# Patient Record
Sex: FEMALE | Race: WHITE | NOT HISPANIC OR LATINO | URBAN - METROPOLITAN AREA
[De-identification: names, ages, dates, MRNs, and addresses within clinical notes are randomized per-mention and may not be internally consistent; named-entity substitution may affect disease eponyms.]

---

## 2020-07-16 ENCOUNTER — OFFICE VISIT (OUTPATIENT)
Dept: URBAN - METROPOLITAN AREA TELEHEALTH 2 | Facility: TELEHEALTH | Age: 35
End: 2020-07-16

## 2020-08-04 LAB
C-REACTIVE PROTEIN: (no result)
SED RATE BY MODIFIED WESTERGREN: (no result)

## 2021-10-12 ENCOUNTER — OFFICE VISIT (OUTPATIENT)
Dept: URBAN - METROPOLITAN AREA CLINIC 60 | Facility: CLINIC | Age: 36
End: 2021-10-12

## 2021-10-14 ENCOUNTER — LAB OUTSIDE AN ENCOUNTER (OUTPATIENT)
Dept: URBAN - METROPOLITAN AREA CLINIC 121 | Facility: CLINIC | Age: 36
End: 2021-10-14

## 2021-10-14 LAB
A/G RATIO: 2
ALBUMIN: (no result)
ALKALINE PHOSPHATASE: (no result)
ALT (SGPT): (no result)
AST (SGOT): (no result)
BASO (ABSOLUTE): (no result)
BASOS: (no result)
BILIRUBIN, TOTAL: (no result)
BUN/CREATININE RATIO: 19
BUN: (no result)
C-REACTIVE PROTEIN, QUANT: (no result)
CALCIUM: (no result)
CARBON DIOXIDE, TOTAL: (no result)
CHLORIDE: (no result)
CREATININE: (no result)
EGFR IF AFRICN AM: (no result)
EGFR IF NONAFRICN AM: (no result)
EOS (ABSOLUTE): (no result)
EOS: (no result)
GLOBULIN, TOTAL: (no result)
GLUCOSE: (no result)
HEMATOCRIT: (no result)
HEMATOLOGY COMMENTS:: (no result)
HEMOGLOBIN: (no result)
IMMATURE CELLS: (no result)
IMMATURE GRANS (ABS): (no result)
IMMATURE GRANULOCYTES: (no result)
LYMPHS (ABSOLUTE): (no result)
LYMPHS: (no result)
MCH: (no result)
MCHC: (no result)
MCV: (no result)
MONOCYTES(ABSOLUTE): (no result)
MONOCYTES: (no result)
NEUTROPHILS (ABSOLUTE): (no result)
NEUTROPHILS: (no result)
NRBC: (no result)
PLATELETS: (no result)
POTASSIUM: (no result)
PROTEIN, TOTAL: (no result)
RBC: (no result)
RDW: (no result)
SODIUM: (no result)
WBC: (no result)

## 2021-10-21 ENCOUNTER — OFFICE VISIT (OUTPATIENT)
Dept: URBAN - METROPOLITAN AREA TELEHEALTH 2 | Facility: TELEHEALTH | Age: 36
End: 2021-10-21

## 2021-10-23 LAB
C DIFFICILE TOXINS A+B, EIA: NEGATIVE
CALPROTECTIN, FECAL: (no result)
CAMPYLOBACTER CULTURE: (no result)
E COLI SHIGA TOXIN EIA: NEGATIVE
Lab: (no result)
OVA + PARASITE EXAM: (no result)
SALMONELLA/SHIGELLA SCREEN: (no result)
WHITE BLOOD CELLS (WBC), STOOL: (no result)

## 2021-12-08 ENCOUNTER — OFFICE VISIT (OUTPATIENT)
Dept: URBAN - METROPOLITAN AREA SURGERY CENTER 4 | Facility: SURGERY CENTER | Age: 36
End: 2021-12-08

## 2021-12-23 ENCOUNTER — OFFICE VISIT (OUTPATIENT)
Dept: URBAN - METROPOLITAN AREA TELEHEALTH 2 | Facility: TELEHEALTH | Age: 36
End: 2021-12-23

## 2021-12-29 ENCOUNTER — OFFICE VISIT (OUTPATIENT)
Dept: URBAN - METROPOLITAN AREA SURGERY CENTER 4 | Facility: SURGERY CENTER | Age: 36
End: 2021-12-29

## 2022-01-13 ENCOUNTER — OFFICE VISIT (OUTPATIENT)
Dept: URBAN - METROPOLITAN AREA TELEHEALTH 2 | Facility: TELEHEALTH | Age: 37
End: 2022-01-13

## 2022-03-17 ENCOUNTER — OFFICE VISIT (OUTPATIENT)
Dept: URBAN - METROPOLITAN AREA CLINIC 60 | Facility: CLINIC | Age: 37
End: 2022-03-17

## 2022-04-14 ENCOUNTER — OFFICE VISIT (OUTPATIENT)
Dept: URBAN - METROPOLITAN AREA CLINIC 60 | Facility: CLINIC | Age: 37
End: 2022-04-14

## 2022-04-20 ENCOUNTER — OFFICE VISIT (OUTPATIENT)
Dept: URBAN - METROPOLITAN AREA SURGERY CENTER 4 | Facility: SURGERY CENTER | Age: 37
End: 2022-04-20

## 2022-04-25 LAB — PATHOLOGY (INDENTED REPORT): (no result)

## 2022-05-05 ENCOUNTER — OFFICE VISIT (OUTPATIENT)
Dept: URBAN - METROPOLITAN AREA TELEHEALTH 2 | Facility: TELEHEALTH | Age: 37
End: 2022-05-05

## 2022-07-09 ENCOUNTER — TELEPHONE ENCOUNTER (OUTPATIENT)
Dept: URBAN - METROPOLITAN AREA CLINIC 121 | Facility: CLINIC | Age: 37
End: 2022-07-09

## 2022-07-09 RX ORDER — MESALAMINE 4 G/60ML
ENEMA RECTAL
Refills: 0 | OUTPATIENT
Start: 2015-11-24 | End: 2016-05-31

## 2022-07-09 RX ORDER — ONDANSETRON 4 MG/1
PRN TABLET, ORALLY DISINTEGRATING ORAL EVERY 8 HOURS
Refills: 1 | OUTPATIENT
Start: 2013-03-04 | End: 2013-09-18

## 2022-07-09 RX ORDER — MESALAMINE 1000 MG/1
ONCE A DAY AT NIGHT SUPPOSITORY RECTAL ONCE A DAY
Refills: 0 | OUTPATIENT
Start: 2021-10-25 | End: 2022-04-14

## 2022-07-09 RX ORDER — ONDANSETRON 4 MG/1
USE AS DIRECTED TAKE ONE TABLET 30 MINUTES PRIOR TO EACH PART OF COLONOSCOPY PREP TABLET, ORALLY DISINTEGRATING ORAL
Refills: 0 | OUTPATIENT
Start: 2022-04-14 | End: 2022-05-05

## 2022-07-09 RX ORDER — MESALAMINE 400 MG/1
TAKE 2 CAPSULES BY MOUTH THREE TIMES DAILY CAPSULE, DELAYED RELEASE ORAL
Refills: 1 | OUTPATIENT
Start: 2022-05-10 | End: 2022-05-10

## 2022-07-09 RX ORDER — MESALAMINE 4 G/60ML
ADMINISTER ENEMA Q HS PRN ENEMA RECTAL TAKE AS DIRECTED
Refills: 5 | OUTPATIENT
Start: 2015-08-18 | End: 2015-11-24

## 2022-07-09 RX ORDER — PREDNISONE 50 MG/1
TABLET ORAL
Refills: 0 | OUTPATIENT
Start: 2012-10-02 | End: 2013-03-04

## 2022-07-09 RX ORDER — HYDROCORTISONE ACETATE 25 MG/1
INSERT ONE INTO RECTUM QHS PRN SUPPOSITORY RECTAL
Refills: 1 | OUTPATIENT
Start: 2013-04-24 | End: 2013-09-18

## 2022-07-09 RX ORDER — METRONIDAZOLE 500 MG/1
TABLET ORAL THREE TIMES A DAY
Refills: 0 | OUTPATIENT
Start: 2013-04-24 | End: 2013-04-24

## 2022-07-09 RX ORDER — MESALAMINE 4 G/60ML
ONCE A DAY - QHS SUSPENSION RECTAL ONCE A DAY
Refills: 1 | OUTPATIENT
Start: 2015-07-28 | End: 2015-08-18

## 2022-07-09 RX ORDER — MESALAMINE 4 G/60ML
ENEMA RECTAL AS NEEDED
Refills: 0 | OUTPATIENT
Start: 2016-05-31 | End: 2019-11-05

## 2022-07-09 RX ORDER — PRENATAL VIT NO.130/IRON/FOLIC 27MG-0.8MG
TABLET ORAL
Refills: 0 | OUTPATIENT
Start: 2015-11-24 | End: 2016-05-31

## 2022-07-09 RX ORDER — METRONIDAZOLE 500 MG/1
TABLET ORAL THREE TIMES A DAY
Refills: 0 | OUTPATIENT
Start: 2013-03-04 | End: 2013-04-24

## 2022-07-09 RX ORDER — MESALAMINE 4 G/60ML
ENEMA RECTAL AS NEEDED
Refills: 0 | OUTPATIENT
Start: 2014-08-08 | End: 2015-08-18

## 2022-07-09 RX ORDER — MESALAMINE 4 G/60ML
ENEMA RECTAL
Refills: 0 | OUTPATIENT
Start: 2013-10-03 | End: 2014-08-08

## 2022-07-10 ENCOUNTER — TELEPHONE ENCOUNTER (OUTPATIENT)
Dept: URBAN - METROPOLITAN AREA CLINIC 121 | Facility: CLINIC | Age: 37
End: 2022-07-10

## 2022-07-10 RX ORDER — DM/PE/ACETAMINOPHEN/CHLORPHENR 10-5-325-2
TABLET, SEQUENTIAL ORAL ONCE A DAY
Refills: 0 | Status: ACTIVE | COMMUNITY
Start: 2022-05-05

## 2022-07-10 RX ORDER — MESALAMINE 400 MG/1
TWO TABLETS 3 TIMES A DAY CAPSULE, DELAYED RELEASE ORAL
Refills: 0 | Status: ACTIVE | COMMUNITY
Start: 2021-10-12

## 2022-07-10 RX ORDER — METRONIDAZOLE 500 MG/1
TABLET ORAL THREE TIMES A DAY
Refills: 0 | Status: ACTIVE | COMMUNITY
Start: 2013-04-24

## 2022-07-10 RX ORDER — PYRITHIONE ZINC 1 G/ML
SHAMPOO TOPICAL ONCE A DAY
Refills: 0 | Status: ACTIVE | COMMUNITY
Start: 2022-05-05

## 2022-07-10 RX ORDER — MESALAMINE 400 MG/1
TAKE 2 CAPSULES BY MOUTH THREE TIMES DAILY CAPSULE, DELAYED RELEASE ORAL
Refills: 0 | Status: ACTIVE | COMMUNITY
Start: 2022-05-10

## 2024-07-08 ENCOUNTER — APPOINTMENT (RX ONLY)
Dept: URBAN - METROPOLITAN AREA CLINIC 335 | Facility: CLINIC | Age: 39
Setting detail: DERMATOLOGY
End: 2024-07-08

## 2024-07-08 DIAGNOSIS — Z41.9 ENCOUNTER FOR PROCEDURE FOR PURPOSES OTHER THAN REMEDYING HEALTH STATE, UNSPECIFIED: ICD-10-CM | Status: INADEQUATELY CONTROLLED

## 2024-07-08 DIAGNOSIS — D18.0 HEMANGIOMA: ICD-10-CM | Status: STABLE

## 2024-07-08 DIAGNOSIS — Z12.83 ENCOUNTER FOR SCREENING FOR MALIGNANT NEOPLASM OF SKIN: ICD-10-CM

## 2024-07-08 DIAGNOSIS — L81.4 OTHER MELANIN HYPERPIGMENTATION: ICD-10-CM | Status: STABLE

## 2024-07-08 DIAGNOSIS — L85.3 XEROSIS CUTIS: ICD-10-CM | Status: INADEQUATELY CONTROLLED

## 2024-07-08 DIAGNOSIS — L82.1 OTHER SEBORRHEIC KERATOSIS: ICD-10-CM | Status: STABLE

## 2024-07-08 DIAGNOSIS — L20.89 OTHER ATOPIC DERMATITIS: ICD-10-CM | Status: INADEQUATELY CONTROLLED

## 2024-07-08 DIAGNOSIS — L81.5 LEUKODERMA, NOT ELSEWHERE CLASSIFIED: ICD-10-CM | Status: INADEQUATELY CONTROLLED

## 2024-07-08 DIAGNOSIS — I83.9 ASYMPTOMATIC VARICOSE VEINS OF LOWER EXTREMITIES: ICD-10-CM | Status: INADEQUATELY CONTROLLED

## 2024-07-08 PROBLEM — I83.93 ASYMPTOMATIC VARICOSE VEINS OF BILATERAL LOWER EXTREMITIES: Status: ACTIVE | Noted: 2024-07-08

## 2024-07-08 PROBLEM — D18.01 HEMANGIOMA OF SKIN AND SUBCUTANEOUS TISSUE: Status: ACTIVE | Noted: 2024-07-08

## 2024-07-08 PROCEDURE — ? COSMETIC CONSULTATION: BOTOX

## 2024-07-08 PROCEDURE — ? TREATMENT REGIMEN

## 2024-07-08 PROCEDURE — ? FULL BODY SKIN EXAM

## 2024-07-08 PROCEDURE — ? PRESCRIPTION

## 2024-07-08 PROCEDURE — ? COUNSELING

## 2024-07-08 PROCEDURE — 99204 OFFICE O/P NEW MOD 45 MIN: CPT

## 2024-07-08 RX ORDER — CRISABOROLE 20 MG/G
OINTMENT TOPICAL
Qty: 60 | Refills: 3 | Status: ERX | COMMUNITY
Start: 2024-07-08

## 2024-07-08 RX ORDER — TRIAMCINOLONE ACETONIDE 1 MG/G
CREAM TOPICAL BID
Qty: 80 | Refills: 2 | Status: ERX | COMMUNITY
Start: 2024-07-08

## 2024-07-08 RX ADMIN — CRISABOROLE: 20 OINTMENT TOPICAL at 00:00

## 2024-07-08 RX ADMIN — TRIAMCINOLONE ACETONIDE: 1 CREAM TOPICAL at 00:00

## 2024-07-08 ASSESSMENT — LOCATION DETAILED DESCRIPTION DERM
LOCATION DETAILED: LEFT DISTAL PRETIBIAL REGION
LOCATION DETAILED: RIGHT SUPERIOR LATERAL BUCCAL CHEEK
LOCATION DETAILED: LEFT DISTAL CALF
LOCATION DETAILED: INFERIOR MID FOREHEAD
LOCATION DETAILED: RIGHT LATERAL UPPER BACK
LOCATION DETAILED: LEFT LATERAL ABDOMEN
LOCATION DETAILED: LEFT PROXIMAL PRETIBIAL REGION
LOCATION DETAILED: RIGHT DISTAL CALF
LOCATION DETAILED: LEFT RADIAL DORSAL HAND
LOCATION DETAILED: RIGHT PROXIMAL PRETIBIAL REGION

## 2024-07-08 ASSESSMENT — LOCATION ZONE DERM
LOCATION ZONE: FACE
LOCATION ZONE: LEG
LOCATION ZONE: HAND
LOCATION ZONE: TRUNK

## 2024-07-08 ASSESSMENT — BSA RASH: BSA RASH: 10

## 2024-07-08 ASSESSMENT — LOCATION SIMPLE DESCRIPTION DERM
LOCATION SIMPLE: ABDOMEN
LOCATION SIMPLE: RIGHT CALF
LOCATION SIMPLE: RIGHT CHEEK
LOCATION SIMPLE: LEFT HAND
LOCATION SIMPLE: RIGHT PRETIBIAL REGION
LOCATION SIMPLE: LEFT PRETIBIAL REGION
LOCATION SIMPLE: LEFT CALF
LOCATION SIMPLE: INFERIOR FOREHEAD
LOCATION SIMPLE: RIGHT UPPER BACK

## 2024-07-08 ASSESSMENT — SEVERITY ASSESSMENT 2020: SEVERITY 2020: MODERATE

## 2024-07-08 NOTE — PROCEDURE: COUNSELING
Detail Level: Generalized
Detail Level: Detailed
Antihistamine Recommendations: Zyrtec /Cetirizine-  Take 1 tablet every night.  This will cause drowsiness, so make sure that it is taken at night time (medication is over the counter). You can purchase the generic if you would like. \\n\\nClaritin/ Loratadine-  Take 1 tablet every morning. This will NOT cause drowsiness. (medication is over the counter). You can purchase the generic if you would like.
Bleach Bath Recommendations: Bleach Baths- Fill the bathtub with water and 1 capful of bleach into water. Mix water and sit in bathtub for 5 mins, then rinse. This can be done twice a week.
Cleanser Recommendations: Cetaphil soap - wash the body with the bar soap and moisturize with the cream at least twice a day to keep the body moisturized.  When there is no rash, continue with the Cetaphil everyday to maintain moisture.
Moisturizer Recommendations: CeraVe  cream- moisturize with the cream at least twice a day to keep the body moisturized.

## 2024-07-08 NOTE — HPI: EVALUATION OF SKIN LESION(S)
What Type Of Note Output Would You Prefer (Optional)?: Bullet Format
Hpi Title: Evaluation of Skin Lesions
Additional History: Pls check eczema on the hand and spot on left arm.

## 2024-07-11 ENCOUNTER — RX ONLY (OUTPATIENT)
Age: 39
Setting detail: RX ONLY
End: 2024-07-11

## 2024-07-11 RX ORDER — HYDROCORTISONE 25 MG/G
CREAM TOPICAL
Qty: 28.35 | Refills: 1 | Status: ERX | COMMUNITY
Start: 2024-07-11